# Patient Record
Sex: MALE | Race: WHITE | NOT HISPANIC OR LATINO | URBAN - METROPOLITAN AREA
[De-identification: names, ages, dates, MRNs, and addresses within clinical notes are randomized per-mention and may not be internally consistent; named-entity substitution may affect disease eponyms.]

---

## 2024-08-08 ENCOUNTER — DOCUMENTATION (OUTPATIENT)
Dept: PRIMARY CARE | Facility: CLINIC | Age: 64
End: 2024-08-08

## 2024-08-08 ENCOUNTER — PATIENT OUTREACH (OUTPATIENT)
Dept: CARE COORDINATION | Facility: CLINIC | Age: 64
End: 2024-08-08

## 2024-08-08 NOTE — PROGRESS NOTES
This CM spoke with patient after he called concerned about dizziness after going from a sitting position to a standing position. Encouraged pt to reach out to Dr Rosenberg's office for further instructions on use of Toprol XL per his discharge instructions. Pt  explained to this CM he is no longer a patient of Dr Coffman and is declining TCM.

## 2024-08-08 NOTE — PROGRESS NOTES
Discharge Facility:[facility]  Discharge Diagnosis:Chest pain, unspecified type   Unstable angina (HCC)   Admission Date:8/5/24  Discharge Date: 8/7/24    PCP Appointment Date:Portia Coffman MD TBD/tasked to office  Specialist Appointment Date: TBD  Hospital Encounter and Summary Linked: Yes      Two attempts were made to reach patient within two business days after discharge. Voicemail left with contact information for patient to call back with any non-emergent questions or concerns.